# Patient Record
Sex: MALE | URBAN - METROPOLITAN AREA
[De-identification: names, ages, dates, MRNs, and addresses within clinical notes are randomized per-mention and may not be internally consistent; named-entity substitution may affect disease eponyms.]

---

## 2019-11-29 ENCOUNTER — EMERGENCY (EMERGENCY)
Facility: HOSPITAL | Age: 34
LOS: 1 days | Discharge: HOME | End: 2019-11-29
Attending: EMERGENCY MEDICINE | Admitting: EMERGENCY MEDICINE
Payer: COMMERCIAL

## 2019-11-29 VITALS
WEIGHT: 179.9 LBS | DIASTOLIC BLOOD PRESSURE: 85 MMHG | SYSTOLIC BLOOD PRESSURE: 132 MMHG | HEART RATE: 78 BPM | OXYGEN SATURATION: 98 % | TEMPERATURE: 98 F | RESPIRATION RATE: 18 BRPM | HEIGHT: 68 IN

## 2019-11-29 VITALS
TEMPERATURE: 98 F | SYSTOLIC BLOOD PRESSURE: 144 MMHG | DIASTOLIC BLOOD PRESSURE: 74 MMHG | RESPIRATION RATE: 16 BRPM | OXYGEN SATURATION: 99 % | HEART RATE: 71 BPM

## 2019-11-29 PROCEDURE — 29515 APPLICATION SHORT LEG SPLINT: CPT | Mod: RT

## 2019-11-29 PROCEDURE — 82962 GLUCOSE BLOOD TEST: CPT

## 2019-11-29 PROCEDURE — 73610 X-RAY EXAM OF ANKLE: CPT | Mod: 26,RT

## 2019-11-29 PROCEDURE — 99285 EMERGENCY DEPT VISIT HI MDM: CPT | Mod: 25

## 2019-11-29 PROCEDURE — 99284 EMERGENCY DEPT VISIT MOD MDM: CPT | Mod: 25

## 2019-11-29 PROCEDURE — 73610 X-RAY EXAM OF ANKLE: CPT

## 2019-11-29 NOTE — ED PROVIDER NOTE - OBJECTIVE STATEMENT
35 y/o M with no significant PMHx presents to ED status post alcohol intoxication. Pt reports after consuming excessive alcohol this evening he went against medical instruction and removed the cast off his right lower extremity, put in place for healing of tibia fracture. Pt reports that he underwent surgery at [facility] last week for tibia fracture; he had staples placed on both sides of bone, and had a cast placed on leg, scheduled to be removed Nov 6. Pt denies current pain to right LE, fevers, chills, headache, denies falling or hitting his head, and denies walking or bearing weight on injured leg. Pt reports that prior to ED arrival he took a Tramadol for pain management. 33 y/o M with no significant PMHx presents to ED status post alcohol intoxication. Pt reports after consuming excessive alcohol this evening he went against medical instruction and removed the cast off his right lower extremity, put in place for healing of tibia fracture. Pt reports that he underwent surgery at [facility] 2  weeks ago for tibia fracture; and had a cast placed on leg, scheduled to be removed Dec 6. Pt denies pain to right LE, fevers, chills, headache, denies falling or hitting his head, and denies walking or bearing weight on injured leg. Pt reports that prior to ED arrival he took a Tramadol for pain .

## 2019-11-29 NOTE — ED PROVIDER NOTE - NSFOLLOWUPINSTRUCTIONS_ED_ALL_ED_FT
I have discussed the discharge plan with the patient. The patient agrees with the plan, as discussed.  The patient understands Emergency Department diagnosis is a preliminary diagnosis often based on limited information and that the patient must adhere to the follow-up plan as discussed.  The patient understands that if the symptoms worsen or if prescribed medications do not have the desired/planned effect that the patient may return to the Emergency Department at any time for further evaluation and treatment.      Cast or Splint Care, Adult  Casts and splints are supports that are worn to protect broken bones and other injuries. A cast or splint may hold a bone still and in the correct position while it heals. Casts and splints may also help ease pain, swelling, and muscle spasms.  A cast is a hardened support that is usually made of fiberglass or plaster. It is custom-fit to the body and it offers more protection than a splint. It cannot be taken off and put back on. A splint is a type of soft support that is usually made from cloth and elastic. It can be adjusted or taken off as needed.  You may need a cast or a splint if you:  Have a broken bone.Have a soft-tissue injury.Need to keep an injured body part from moving (keep it immobile) after surgery.How is this treated?  If you have a cast:        Do not stick anything inside the cast to scratch your skin. Sticking something in the cast increases your risk of infection.Check the skin around the cast every day. Tell your health care provider about any concerns.You may put lotion on dry skin around the edges of the cast. Do not put lotion on the skin underneath the cast.Keep the cast clean.If the cast is not waterproof:  Do not let it get wet.Cover it with a watertight covering when you take a bath or a shower.If you have a splint:        Wear it as told by your health care provider. Remove it only as told by your health care provider.Loosen the splint if your fingers or toes tingle, become numb, or turn cold and blue.Keep the splint clean.If the splint is not waterproof:  Do not let it get wet.Cover it with a watertight covering when you take a bath or a shower.Bathing     Do not take baths or swim until your health care provider approves. Ask your health care provider if you can take showers. You may only be allowed to take sponge baths for bathing.If your cast or splint is not waterproof, cover it with a watertight covering when you take a bath or shower.Managing pain, stiffness, and swelling     Move your fingers or toes often to avoid stiffness and to lessen swelling.Raise (elevate) the injured area above the level of your heart while sitting or lying down.Safety     Do not use the injured limb to support your body weight until your health care provider says that it is okay.Use crutches or other assistive devices as told by your health care provider.General instructions     Do not put pressure on any part of the cast or splint until it is fully hardened. This may take several hours.Return to your normal activities as told by your health care provider. Ask your health care provider what activities are safe for you.Take over-the-counter and prescription medicines only as told by your health care provider.Keep all follow-up visits as told by your health care provider. This is important.Contact a health care provider if:  Your cast or splint gets damaged.The skin around the cast gets red or raw.The skin under the cast is extremely itchy or painful.Your cast or splint feels very uncomfortable.Your cast or splint is too tight or too loose.Your cast becomes wet or it develops a soft spot or area.You get an object stuck under your cast.Get help right away if:  Your pain is getting worse.The injured area tingles, becomes numb, or turns cold and blue.The part of your body above or below the cast is swollen and discolored.You cannot feel or move your fingers or toes.There is fluid leaking through the cast.You have severe pain or pressure under the cast.You have trouble breathing.You have shortness of breath.You have chest pain.This information is not intended to replace advice given to you by your health care provider. Make sure you discuss any questions you have with your health care provider.            ALCOHOL INTOXICATION - AfterCare(R) Instructions(ER/ED)     Alcohol Intoxication    WHAT YOU NEED TO KNOW:    Alcohol intoxication is a harmful physical condition caused when you drink more alcohol than your body can handle. It is also called ethanol poisoning, or being drunk.    DISCHARGE INSTRUCTIONS:    Call your local emergency number (911 in the [place]) if:     You have sudden trouble breathing or chest pain.      You have a seizure.      You feel sad enough to harm yourself or others.    Call your doctor if:     You have hallucinations (you see or hear things that are not real).      You cannot stop vomiting.      You have questions or concerns about your condition or care.    Recommended alcohol limits:     Men 21 to 64 years should limit alcohol to 2 drinks a day. Do not have more than 4 drinks in 1 day or more than 14 in 1 week.      All women, and men 65 or older should limit alcohol to 1 drink in a day. Do not have more than 3 drinks in 1 day or more than 7 in 1 week. No amount of alcohol is okay during pregnancy.    Manage alcohol use:     Decrease the amount you drink. This can help prevent health problems such as brain, heart, and liver damage, high blood pressure, diabetes, and cancer. If you cannot stop completely, healthcare providers can help you set goals to decrease the amount you drink.      Plan weekly alcohol use. You will be less likely to drink more than the recommended limit if you plan ahead.      Have food when you drink alcohol. Food will prevent alcohol from getting into your system too quickly. Eat before you have your first alcohol drink.      Time your drinks carefully. Have no more than 1 drink in an hour. Have a liquid such as water, coffee, or a soft drink between alcohol drinks.      Do not drive if you have had alcohol. Make sure someone who has not been drinking can help you get home.      Do not drink alcohol if you are taking medicine. Alcohol is dangerous when you combine it with certain medicines, such as acetaminophen or blood pressure medicine. Talk to your healthcare provider about all the medicines you currently take.    For more information:     Alcoholics Anonymous  Web Address: http://www.aa.org      Substance Abuse and Mental Health Services Administration  [address]  [place] [address]  Web Address: http://www.samhsa.gov      Follow up with your healthcare provider as directed: Write down your questions so you remember to ask them during your visits.

## 2019-11-29 NOTE — ED PROVIDER NOTE - CHPI ED SYMPTOMS POS
alcohol intoxication, against medical instruction removal of cast on RLE put in place for healing of tibia.

## 2019-11-29 NOTE — ED ADULT NURSE NOTE - CHIEF COMPLAINT QUOTE
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

## 2019-11-29 NOTE — ED PROVIDER NOTE - NSPTACCESSSVCSAPPTDETAILS_ED_ALL_ED_FT
use non-weight bearing splint for support as recommended and f/u with your orthopedc surgeon in 1-3 days for re-evaluation.

## 2019-11-29 NOTE — ED ADULT NURSE NOTE - CHPI ED NUR SYMPTOMS NEG
no change in level of consciousness/no confusion/no vomiting/no loss of consciousness/no weakness/no blurred vision/no dizziness/no fever/no nausea/no numbness

## 2019-11-29 NOTE — ED PROVIDER NOTE - CHPI ED SYMPTOMS NEG
no chills/no fever/no fall, no hit head, no headache,, no walking or bearing weight on injured leg/no pain

## 2019-11-29 NOTE — ED ADULT TRIAGE NOTE - CHIEF COMPLAINT QUOTE
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

## 2019-11-29 NOTE — ED PROCEDURE NOTE - CPROC ED POST PROC CARE GUIDE1
Verbal/written post procedure instructions were given to patient/caregiver./Instructed patient/caregiver regarding signs and symptoms of infection./Elevate the injured extremity as instructed./Keep the cast/splint/dressing clean and dry.

## 2019-11-29 NOTE — ED ADULT NURSE NOTE - OBJECTIVE STATEMENT
Pt brought in by EMS for altered mental status. Patient was drinking and reportedly removed splint to foot. Hx of R ankle surgery at [facility]. Incision site with staples noted to R foot, mild swelling of R foot also noted.

## 2019-11-29 NOTE — ED PROVIDER NOTE - MUSCULOSKELETAL, MLM
Spine appears normal, range of motion is not limited, no muscle or joint tenderness, surgical wounds with staples intact, mild edema and erythema, no surrounding purulence or drainage, no warmth to wound site, no rash, good distal pulses, normal sensation, no calf tenderness

## 2019-11-29 NOTE — ED PROVIDER NOTE - CLINICAL SUMMARY MEDICAL DECISION MAKING FREE TEXT BOX
33 yo male admits drinking today more than usual, states he removed cast from his right lower leg. Pt reports he had surgery for his ankle fx 2 weeks ago. Pt c/o that cast felt tight? and since his ortho surgeon promised him a boot, he removed his cast and came to ER to ask for a boot. Pt denies any fall, denies any injury or pain, denies numbness or tingling to his right leg. no signs of infection and staples are intact  at the surgical sites, compartments soft, no calf tenderness, and NROM. plan : xray, splint, d/c for further f/u with pt's ortho. Pt agrees with a treatment plan.

## 2019-11-29 NOTE — ED PROVIDER NOTE - PATIENT PORTAL LINK FT
You can access the FollowMyHealth Patient Portal offered by [facility] by registering at the following website: [email or web address]. By joining [facility]’s FollowMyHealth portal, you will also be able to view your health information using other applications (apps) compatible with our system.

## 2019-12-04 DIAGNOSIS — R60.0 LOCALIZED EDEMA: ICD-10-CM

## 2019-12-04 DIAGNOSIS — F10.129 ALCOHOL ABUSE WITH INTOXICATION, UNSPECIFIED: ICD-10-CM

## 2019-12-04 DIAGNOSIS — Z48.01 ENCOUNTER FOR CHANGE OR REMOVAL OF SURGICAL WOUND DRESSING: ICD-10-CM

## 2019-12-04 DIAGNOSIS — R41.82 ALTERED MENTAL STATUS, UNSPECIFIED: ICD-10-CM

## 2020-05-01 ENCOUNTER — OUTPATIENT (OUTPATIENT)
Dept: OUTPATIENT SERVICES | Facility: HOSPITAL | Age: 35
LOS: 1 days | End: 2020-05-01
Payer: MEDICAID

## 2020-05-01 PROCEDURE — G9001: CPT

## 2020-05-26 DIAGNOSIS — Z71.89 OTHER SPECIFIED COUNSELING: ICD-10-CM

## 2020-05-26 PROBLEM — Z78.9 OTHER SPECIFIED HEALTH STATUS: Chronic | Status: ACTIVE | Noted: 2019-11-29
